# Patient Record
Sex: MALE | Race: WHITE | Employment: FULL TIME | URBAN - METROPOLITAN AREA
[De-identification: names, ages, dates, MRNs, and addresses within clinical notes are randomized per-mention and may not be internally consistent; named-entity substitution may affect disease eponyms.]

---

## 2019-06-11 ENCOUNTER — HOSPITAL ENCOUNTER (OUTPATIENT)
Dept: GENERAL RADIOLOGY | Age: 54
Discharge: HOME | End: 2019-06-11
Payer: COMMERCIAL

## 2019-06-11 DIAGNOSIS — I10 ESSENTIAL HYPERTENSION, MALIGNANT: ICD-10-CM

## 2019-06-11 PROCEDURE — 71046 X-RAY EXAM CHEST 2 VIEWS: CPT

## 2019-07-12 ENCOUNTER — HOSPITAL ENCOUNTER (EMERGENCY)
Age: 54
Discharge: HOME | End: 2019-07-12
Attending: EMERGENCY MEDICINE
Payer: COMMERCIAL

## 2019-07-12 VITALS
SYSTOLIC BLOOD PRESSURE: 168 MMHG | RESPIRATION RATE: 18 BRPM | OXYGEN SATURATION: 100 % | WEIGHT: 158.29 LBS | HEIGHT: 65 IN | DIASTOLIC BLOOD PRESSURE: 93 MMHG | TEMPERATURE: 98.6 F | HEART RATE: 98 BPM | BODY MASS INDEX: 26.37 KG/M2

## 2019-07-12 DIAGNOSIS — T20.27XA PARTIAL THICKNESS BURN OF NECK, INITIAL ENCOUNTER: Primary | ICD-10-CM

## 2019-07-12 PROCEDURE — 99282 EMERGENCY DEPT VISIT SF MDM: CPT

## 2019-07-12 RX ORDER — GUAIFENESIN 100 MG/5ML
81 LIQUID (ML) ORAL DAILY
COMMUNITY

## 2019-07-12 RX ORDER — ATORVASTATIN CALCIUM 80 MG/1
80 TABLET, FILM COATED ORAL DAILY
COMMUNITY

## 2019-07-12 RX ORDER — BACITRACIN 500 [USP'U]/G
OINTMENT TOPICAL 3 TIMES DAILY
Qty: 1 TUBE | Refills: 0 | Status: SHIPPED | OUTPATIENT
Start: 2019-07-12 | End: 2019-07-22

## 2019-07-12 RX ORDER — LISINOPRIL 10 MG/1
TABLET ORAL DAILY
COMMUNITY

## 2019-07-12 NOTE — ED TRIAGE NOTES
Patient arriving with spot to back of neck after being exposed to Nitric Acid, reports he flushed the area with water, noticed today that the area was darkened, and 'didn't look right'. Denies pain to area.

## 2019-07-12 NOTE — ED NOTES
Patient discharged with instructions. Ambulatory with steady gait out of department, no distress noted.

## 2019-07-13 NOTE — ED PROVIDER NOTES
The history is provided by the patient. Skin Problem    This is a new problem. The current episode started yesterday. The problem has been rapidly worsening. The problem is associated with chemical exposure (nitric acid). There has been no fever. The rash is present on the neck (left shoulder). The pain is mild. The pain has been constant since onset. Treatments tried: soap and water. The treatment provided mild relief. Past Medical History:   Diagnosis Date    High cholesterol     HTN (hypertension)        History reviewed. No pertinent surgical history. History reviewed. No pertinent family history.     Social History     Socioeconomic History    Marital status:      Spouse name: Not on file    Number of children: Not on file    Years of education: Not on file    Highest education level: Not on file   Occupational History    Not on file   Social Needs    Financial resource strain: Not on file    Food insecurity:     Worry: Not on file     Inability: Not on file    Transportation needs:     Medical: Not on file     Non-medical: Not on file   Tobacco Use    Smoking status: Never Smoker    Smokeless tobacco: Current User   Substance and Sexual Activity    Alcohol use: Not Currently    Drug use: Never    Sexual activity: Not on file   Lifestyle    Physical activity:     Days per week: Not on file     Minutes per session: Not on file    Stress: Not on file   Relationships    Social connections:     Talks on phone: Not on file     Gets together: Not on file     Attends Hinduism service: Not on file     Active member of club or organization: Not on file     Attends meetings of clubs or organizations: Not on file     Relationship status: Not on file    Intimate partner violence:     Fear of current or ex partner: Not on file     Emotionally abused: Not on file     Physically abused: Not on file     Forced sexual activity: Not on file   Other Topics Concern    Not on file   Social History Narrative    Not on file         ALLERGIES: Penicillins and Sulfa (sulfonamide antibiotics)    Review of Systems   All other systems reviewed and are negative. Vitals:    07/12/19 1537   BP: (!) 168/93   Pulse: 98   Resp: 18   Temp: 98.6 °F (37 °C)   SpO2: 100%   Weight: 71.8 kg (158 lb 4.6 oz)   Height: 5' 5\" (1.651 m)            Physical Exam   Constitutional: He appears well-developed and well-nourished. No distress. HENT:   Head: Normocephalic and atraumatic. Eyes: Conjunctivae are normal.   Neck: Neck supple. No tracheal deviation present. Cardiovascular: Normal rate and regular rhythm. Pulmonary/Chest: Effort normal. No respiratory distress. Abdominal: He exhibits no distension. Musculoskeletal: Normal range of motion. He exhibits no deformity. Neurological: He is alert. No cranial nerve deficit. Skin: Skin is warm and dry. 3 cm circular superficial partial-thickness burn with desquamation over left posterior shoulder. Punctate ruptured vesicle with surrounding hyperpigmentation of the skin that is non-blanchable at right base of neck posteriorly. Nontender. Psychiatric: His behavior is normal.   Nursing note and vitals reviewed. MDM     68-year-old male presents with burn marks to left posterior shoulder from nitric acid that  he was exposed to as an occupational hazard. He washed the shoulder off immediately following the incident but developed some partial-thickness burns of the area. When he went home he noticed a small lesion over his right posterior neck that has some central peeling and some discoloration extending. I suspect this was a small splash of acid which has caused direct tissue injury resulting in a chemical burn. There was no prodromal lesion leading to this. Will recommend supportive care and follow-up with primary care physician for reevaluation to resolution or pursue further work-up at that time.   Procedures